# Patient Record
Sex: MALE | Race: WHITE | NOT HISPANIC OR LATINO
[De-identification: names, ages, dates, MRNs, and addresses within clinical notes are randomized per-mention and may not be internally consistent; named-entity substitution may affect disease eponyms.]

---

## 2018-12-28 ENCOUNTER — APPOINTMENT (OUTPATIENT)
Dept: ORTHOPEDIC SURGERY | Facility: CLINIC | Age: 38
End: 2018-12-28

## 2018-12-28 PROBLEM — Z00.00 ENCOUNTER FOR PREVENTIVE HEALTH EXAMINATION: Status: ACTIVE | Noted: 2018-12-28

## 2019-02-18 ENCOUNTER — APPOINTMENT (OUTPATIENT)
Dept: RADIOLOGY | Facility: CLINIC | Age: 39
End: 2019-02-18
Payer: COMMERCIAL

## 2019-02-18 ENCOUNTER — OUTPATIENT (OUTPATIENT)
Dept: OUTPATIENT SERVICES | Facility: HOSPITAL | Age: 39
LOS: 1 days | End: 2019-02-18

## 2019-02-18 PROCEDURE — 73030 X-RAY EXAM OF SHOULDER: CPT | Mod: 26,LT

## 2022-03-15 ENCOUNTER — EMERGENCY (EMERGENCY)
Facility: HOSPITAL | Age: 42
LOS: 1 days | Discharge: HOME | End: 2022-03-15
Attending: EMERGENCY MEDICINE
Payer: COMMERCIAL

## 2022-03-15 VITALS
HEIGHT: 71 IN | SYSTOLIC BLOOD PRESSURE: 125 MMHG | DIASTOLIC BLOOD PRESSURE: 83 MMHG | WEIGHT: 184.97 LBS | RESPIRATION RATE: 18 BRPM | HEART RATE: 65 BPM | OXYGEN SATURATION: 97 % | TEMPERATURE: 98 F

## 2022-03-15 VITALS
DIASTOLIC BLOOD PRESSURE: 78 MMHG | TEMPERATURE: 98 F | OXYGEN SATURATION: 98 % | HEART RATE: 74 BPM | SYSTOLIC BLOOD PRESSURE: 132 MMHG | RESPIRATION RATE: 18 BRPM

## 2022-03-15 PROCEDURE — 99283 EMERGENCY DEPT VISIT LOW MDM: CPT

## 2022-03-15 RX ORDER — GENTAMICIN SULFATE 3 MG/ML
2 SOLUTION/ DROPS OPHTHALMIC
Qty: 1 | Refills: 0
Start: 2022-03-15 | End: 2022-03-21

## 2022-03-15 NOTE — ED PROVIDER NOTE - CLINICAL SUMMARY MEDICAL DECISION MAKING FREE TEXT BOX
pt with preseptal cellulitis, no pain with eye movement.  Mild conjunctival involvement.  pt will continue with Amox PO and advised to place warm compress. rtx gent opthalmic.   Pt is well appearing, has no new complaints and able to walk with normal gait. Pt is stable for discharge and follow up with medical doctor. Pt educated on care and need for follow up. Discussed anticipatory guidance and return precautions. Questions answered. I had a detailed discussion with the patient and/or guardian regarding the historical points, exam findings, and any diagnostic results supporting the discharge diagnosis. pt with preseptal cellulitis, no pain with eye movement. No vision changes.  Mild conjunctival involvement.  Pt will continue with Amox PO and advised to place warm compress. Rx gent opthalmic.   Pt is well appearing, has no new complaints and able to walk with normal gait. Pt is stable for discharge and follow up with medical doctor. Pt educated on care and need for follow up. Discussed anticipatory guidance and return precautions. Questions answered. I had a detailed discussion with the patient and/or guardian regarding the historical points, exam findings, and any diagnostic results supporting the discharge diagnosis.

## 2022-03-15 NOTE — ED PROVIDER NOTE - PHYSICAL EXAMINATION
tight lower eye lid mild swelling and erythema. No discharge, no fluid focus. Extra ocular muscles intact, pupils 3mm and reactive to light, no photophobia, no pain with eye movement, no hyphema  Mild conjunctival injection.  OD/OS 20/20.

## 2022-03-15 NOTE — ED ADULT TRIAGE NOTE - CHIEF COMPLAINT QUOTE
pt. stated "I woke this morning around 6:40am, c/o rt. eye pain &  swollen. on ABT. augmentin  875 started at 4pm. denies blurry vision

## 2022-03-15 NOTE — ED ADULT NURSE NOTE - OBJECTIVE STATEMENT
42 yo male lying on bed c/o right eye pain with swelling that started this morning when he woke up. Patient took Augmentin 875 MG PO at around 1600.

## 2022-03-15 NOTE — ED PROVIDER NOTE - NSFOLLOWUPINSTRUCTIONS_ED_ALL_ED_FT
Preseptal cellulitis is an infection of the eyelid and the tissues around the eye (periorbital area). The infection causes painful swelling and redness. This condition may also be called periorbital cellulitis.    In most cases, the condition can be treated with antibiotic medicine at home. It is important to treat preseptal cellulitis right away so that it does not get worse. If it gets worse, it can spread to the eye socket and eye muscles (orbital cellulitis). Orbital cellulitis is a medical emergency.      What are the causes?    Preseptal cellulitis is most commonly caused by bacteria. In rare cases, it can be caused by a virus or fungus. The germs that cause preseptal cellulitis may come from:  •A sinus infection that spreads near the eyes.      •An injury near the eye, such as a scratch, puncture wound, animal bite, or insect bite.      •A skin rash, such as eczema or poison ivy, that becomes infected.      •An infected pimple on the eyelid (stye).      •Infection after eyelid surgery or injury.        What increases the risk?    You are more likely to develop this condition if:  •You have a weakened disease-fighting system (immune system).      •You have a medical condition that raises your risk for sinus infections, such as nasal polyps.        What are the signs or symptoms?     Symptoms of this condition include:  •Eyelids that are red and swollen and feel unusually hot.      •Fever.      •Difficulty opening the eye.      •Headache.      •Pain in the face.      Symptoms of this condition usually develop suddenly.      How is this diagnosed?    This condition may be diagnosed based on your symptoms, your medical history, and an eye exam. You may also have tests, such as:  •Blood tests.      •Tests (cultures) to find out which specific bacteria are causing the infection. You may have a culture of any open wound or drainage.      •CT scan.      •MRI. This is less common.        How is this treated?    This condition is treated with antibiotic medicines. These may be given by mouth (orally), through an IV, or as an injection. In rare cases, you may need surgery to drain an infected area.      Follow these instructions at home:    Medicines     •Take your antibiotic medicine as told by your health care provider. Do not stop taking the antibiotic even if you start to feel better      •Take over-the-counter and prescription medicines only as told by your health care provider.      Eye Care     • Do not use eye drops without first getting approval from your health care provider.      • Do not touch or rub your eye. If you wear contact lenses, do not wear them until your health care provider approves.      •Keep the eye area clean and dry.      •Wash the eye area with a clean washcloth, warm water, and baby shampoo or mild soap.      •To help relieve discomfort, place a clean washcloth that is wet with warm water over your eye. Leave the washcloth on for a few minutes, then remove it.      General instructions     •Wash your hands with soap and water often for at least 20 seconds. If soap and water are not available, use hand .      • Do not use any products that contain nicotine or tobacco, such as cigarettes, e-cigarettes, and chewing tobacco. If you need help quitting, ask your health care provider.      •Drink enough fluid to keep your urine pale yellow.      • Do not drive or operate machinery until your health care provider says that it is safe. Ask your health care provider if it is safe for you to drive.      •Stay up to date on your vaccinations.      •Keep all follow-up visits. This includes any visits with an eye specialist (ophthalmologist) or dentist. This is important.        Get help right away if:    •You have new symptoms.      •Your symptoms get worse or do not get better with treatment.      •You have a fever.      •Your vision becomes blurry or gets worse in any way.      •Your eye looks like it is sticking out or bulging out (proptosis).      •You develop double vision.      •You have trouble moving your eyes or pain when moving your eyes      •You have a severe headache.      •You have neck stiffness or severe neck pain.      These symptoms may represent a serious problem that is an emergency. Do not wait to see if the symptoms will go away. Get medical help right away. Call your local emergency services (911 in the [place] Do not drive yourself to the hospital.       Summary    •Preseptal cellulitis is an infection of the eyelid and the tissues around the eye.      •Symptoms of preseptal cellulitis usually develop suddenly and include red and swollen eyelids, fever, difficulty opening the eye, headache, and facial pain.      •This condition is treated with antibiotic medicines. Do not stop taking the antibiotic even if you start to feel better.      •Preseptal cellulitis can develop into orbital cellulitis, which is a medical emergency. If your condition does not improve or worsens, visit your jorden care provider right away.      This information is not intended to replace advice given to you by your health care provider. Make sure you discuss any questions you have with your health care provider.

## 2022-03-15 NOTE — ED PROVIDER NOTE - OBJECTIVE STATEMENT
Chief complaint of  right lower eye lid swelling since yesterday. Pt was prescribed Amox 875mg BID by telemedicine.  Pt still with lid swelling and states left eye mildly red.  No pain with eye movement. Chief complaint of right lower eye lid swelling since yesterday. Pt was prescribed Amox 875mg BID by telemedicine.  Pt still with lid swelling and states left eye mildly red.  No pain with eye movement.

## 2022-03-15 NOTE — ED PROVIDER NOTE - NSFOLLOWUPCLINICS_GEN_ALL_ED_FT
[facility]  Internal Medicine  [address] [place] [address]  Phone: [phone]  Fax: [phone]

## 2022-03-15 NOTE — ED PROVIDER NOTE - PATIENT PORTAL LINK FT
You can access the FollowMyHealth Patient Portal offered by [facility] by registering at the following website: [email or web address]. By joining [facility]’s FollowMyHealth portal, you will also be able to view your health information using other applications (apps) compatible with our system.

## 2024-06-13 PROBLEM — Z78.9 OTHER SPECIFIED HEALTH STATUS: Chronic | Status: ACTIVE | Noted: 2022-04-02

## 2024-06-25 ENCOUNTER — NON-APPOINTMENT (OUTPATIENT)
Age: 44
End: 2024-06-25

## 2024-06-26 ENCOUNTER — APPOINTMENT (OUTPATIENT)
Age: 44
End: 2024-06-26
Payer: COMMERCIAL

## 2024-06-26 VITALS
HEIGHT: 71 IN | SYSTOLIC BLOOD PRESSURE: 114 MMHG | DIASTOLIC BLOOD PRESSURE: 77 MMHG | WEIGHT: 185 LBS | HEART RATE: 71 BPM | OXYGEN SATURATION: 96 % | BODY MASS INDEX: 25.9 KG/M2

## 2024-06-26 DIAGNOSIS — M75.52 BURSITIS OF LEFT SHOULDER: ICD-10-CM

## 2024-06-26 DIAGNOSIS — M25.512 PAIN IN LEFT SHOULDER: ICD-10-CM

## 2024-06-26 PROCEDURE — 99203 OFFICE O/P NEW LOW 30 MIN: CPT
